# Patient Record
Sex: FEMALE | Race: BLACK OR AFRICAN AMERICAN | NOT HISPANIC OR LATINO | URBAN - METROPOLITAN AREA
[De-identification: names, ages, dates, MRNs, and addresses within clinical notes are randomized per-mention and may not be internally consistent; named-entity substitution may affect disease eponyms.]

---

## 2018-11-11 ENCOUNTER — EMERGENCY (EMERGENCY)
Facility: HOSPITAL | Age: 38
LOS: 1 days | Discharge: HOME | End: 2018-11-11
Attending: EMERGENCY MEDICINE | Admitting: EMERGENCY MEDICINE
Payer: COMMERCIAL

## 2018-11-11 VITALS
OXYGEN SATURATION: 95 % | DIASTOLIC BLOOD PRESSURE: 91 MMHG | TEMPERATURE: 98 F | HEART RATE: 104 BPM | RESPIRATION RATE: 18 BRPM | SYSTOLIC BLOOD PRESSURE: 138 MMHG

## 2018-11-11 VITALS
TEMPERATURE: 98 F | HEART RATE: 87 BPM | OXYGEN SATURATION: 97 % | DIASTOLIC BLOOD PRESSURE: 82 MMHG | RESPIRATION RATE: 18 BRPM | SYSTOLIC BLOOD PRESSURE: 135 MMHG

## 2018-11-11 DIAGNOSIS — F41.9 ANXIETY DISORDER, UNSPECIFIED: ICD-10-CM

## 2018-11-11 DIAGNOSIS — F17.200 NICOTINE DEPENDENCE, UNSPECIFIED, UNCOMPLICATED: ICD-10-CM

## 2018-11-11 DIAGNOSIS — F10.129 ALCOHOL ABUSE WITH INTOXICATION, UNSPECIFIED: ICD-10-CM

## 2018-11-11 PROCEDURE — 99285 EMERGENCY DEPT VISIT HI MDM: CPT

## 2018-11-11 PROCEDURE — 99284 EMERGENCY DEPT VISIT MOD MDM: CPT | Mod: 25

## 2018-11-11 NOTE — ED PROVIDER NOTE - ATTENDING CONTRIBUTION TO CARE
The pt is a 39 y/o F, who presents to ED c/o panic attack a few hrs ago. Pt states that "partied hard", states that had 3 mixed drinks, smoked cigarette, and had a pot brownie, then started to feel anxiety.    PE well appearing with normal vital signs  Rested in ED and feels better.  Pt understands to avoid stimulants in the future.  Feels ok for discharge.

## 2018-11-11 NOTE — ED PROVIDER NOTE - MEDICAL DECISION MAKING DETAILS
pt had an anxiety attack after drinking alcohol and eating pot brownie, no si/hi, hemodynamically stable, slept in ed, feeling better, tolerating po, stable for dc, had long discussion about need to avoid stimulants in order not to trigger panic attacks, pt understands and agrees w/plan

## 2018-11-11 NOTE — ED ADULT NURSE NOTE - OBJECTIVE STATEMENT
pt ate a pot laced brownie at 2:30 am and since has felt nausea and anxious.  no vomiting.  no sob.  no chest pain.

## 2022-05-17 NOTE — ED ADULT NURSE NOTE - NS ED NOTE ABUSE SUSPICION NEGLECT YN
Dear Jeronimo,       I tried to connect with you via video but it did not appear that you were properly connected for the visit.    Please use the following link to view the help video or call the help hotline for assistance.     https://[email or web address].us/hc/en-us/articles/201362283-Testing-computer-or-device-audio    For technical support call, [employer] support line at [phone].  If you have questions about your symptoms you can call the [facility] Nurse line at [phone].       If you are experiencing a medical emergency, call 911 immediately.  If your symptoms worsen or do not improve, please contact your primary care provider to schedule an in-person visit. Symptoms that require immediate attention require a visit at Urgent Care ([place]), [facility] ([place]) or the Emergency Room of a nearby hospital.       Thank You,  ASHLEY Byrne    
No

## 2024-05-28 NOTE — ED ADULT TRIAGE NOTE - NS ED NURSE AMBULANCES
Subjective   Patient ID: Carmen Cantu is a 76 y.o. female who presents for Follow-up.    Hypertension-compliant and stable on current medications BP Readings from Last 3 Encounters:  05/28/24 : 118/58  01/29/24 : 100/56  11/14/23 : 110/62    Hyperlipidemia.on no meds.  CT coronary calcium score 9 in 2024 Lab Results       Component                Value               Date                       LDLCALC                  93                  05/25/2024                  Hyperglycemia-Lab Results       Component                Value               Date                       HGBA1C                   5.9 (H)             05/25/2024               Hyperuricemia--Lab Results       Component                Value               Date                       URICACID                 3.8                 05/25/2024                      Exercise-back to 15 laps in pool        Diet     watches carbs very closely    Reviewed all labs             Review of Systems   Constitutional: Negative.  Negative for activity change.   Respiratory:  Negative for cough and shortness of breath.         Cough is gone but becomes short of breath and wheezy with exertion   Cardiovascular: Negative.    Gastrointestinal:  Negative for abdominal pain, constipation and diarrhea.   Genitourinary:  Negative for dysuria.   Musculoskeletal:  Negative for arthralgias.        Has lost use of R hand due to scar tissue after surg; pain in base of thumb and/or have pain shooting from elbow to little finger--will try position change when using computer   Skin:  Positive for rash (recurrant rash-clobetasol helps when recurs-originally from derm).   Psychiatric/Behavioral: Negative.         Objective   /58 (BP Location: Left arm, Patient Position: Sitting)   Pulse 64   Wt 79 kg (174 lb 3.2 oz)   SpO2 98%   BMI 51.01 kg/m²     Physical Exam  Constitutional:       General: She is not in acute distress.     Appearance: Normal appearance.   Cardiovascular:       Rate and Rhythm: Normal rate and regular rhythm.      Heart sounds: Normal heart sounds. No murmur heard.     No gallop.   Pulmonary:      Effort: Pulmonary effort is normal.      Breath sounds: Normal breath sounds. No wheezing, rhonchi or rales.   Musculoskeletal:      Right lower leg: No edema.      Left lower leg: No edema.   Skin:     General: Skin is warm and dry.      Findings: No rash.   Neurological:      General: No focal deficit present.      Mental Status: She is alert and oriented to person, place, and time.   Psychiatric:         Mood and Affect: Mood normal.         Assessment/Plan  will try pre-treating  with symbicort for exercise; continue rest of regimen  Diagnoses and all orders for this visit:  Benign essential HTN  Acquired hypothyroidism  Hyperglycemia  Generalized osteoarthritis  Hyperuricemia  Other chronic pain  Lichen planus  Other orders  -     Follow Up In Primary Care - Established; Future      Current Outpatient Medications:     allopurinol (Zyloprim) 100 mg tablet, Take 1 tablet (100 mg) by mouth once daily., Disp: 90 tablet, Rfl: 3    amLODIPine (Norvasc) 5 mg tablet, Take 1 tablet (5 mg) by mouth once daily., Disp: 90 tablet, Rfl: 3    budesonide-formoteroL (Symbicort) 160-4.5 mcg/actuation inhaler, Inhale 2 puffs 2 times a day. Rinse mouth with water after use to reduce aftertaste and incidence of candidiasis. Do not swallow., Disp: 1 each, Rfl: 0    cholecalciferol (Vitamin D-3) 25 MCG (1000 UT) capsule, Take 1 capsule (25 mcg) by mouth once daily., Disp: , Rfl:     clobetasol (Temovate) 0.05 % cream, Apply topically 2 times a day., Disp: 15 g, Rfl: 2    FLAXSEED OIL ORAL, Take 1,300 mg by mouth once daily., Disp: , Rfl:     levothyroxine (Synthroid, Levoxyl) 75 mcg tablet, Take 1 tablet (75 mcg) by mouth once daily in the morning. Take before meals., Disp: 90 tablet, Rfl: 3    lifitegrast (Xiidra) 5 % dropperette, every 12 hours., Disp: , Rfl:     valsartan-hydrochlorothiazide  (Diovan-HCT) 160-12.5 mg tablet, Take 1 tablet by mouth once daily., Disp: 90 tablet, Rfl: 3       [facility]